# Patient Record
Sex: MALE | Race: WHITE | URBAN - METROPOLITAN AREA
[De-identification: names, ages, dates, MRNs, and addresses within clinical notes are randomized per-mention and may not be internally consistent; named-entity substitution may affect disease eponyms.]

---

## 2022-08-22 ENCOUNTER — OFFICE VISIT (OUTPATIENT)
Dept: URBAN - METROPOLITAN AREA CLINIC 76 | Facility: CLINIC | Age: 57
End: 2022-08-22
Payer: COMMERCIAL

## 2022-08-22 DIAGNOSIS — H10.45 OTHER CHRONIC ALLERGIC CONJUNCTIVITIS: ICD-10-CM

## 2022-08-22 DIAGNOSIS — H25.813 COMBINED FORMS OF AGE-RELATED CATARACT, BILATERAL: Primary | ICD-10-CM

## 2022-08-22 PROCEDURE — 99203 OFFICE O/P NEW LOW 30 MIN: CPT | Performed by: OPTOMETRIST

## 2022-08-22 ASSESSMENT — INTRAOCULAR PRESSURE
OD: 15
OS: 14

## 2022-08-22 NOTE — IMPRESSION/PLAN
Impression: Combined forms of age-related cataract, bilateral: H25.813. Bilateral.
 Plan: Cataracts account for the patient's complaints. Patient understands changing glasses will not improve vision. Recommend tremaine mahoney/ Dr. Charly Armijo for cataract surgery.

## 2022-08-22 NOTE — IMPRESSION/PLAN
Impression: Other chronic allergic conjunctivitis: H10.45. Bilateral.
No chemical burns seen OU Plan: Discussed diagnosis with patient. Recommend OTC oral antihistamine, and Ketotifen 1 drop bid ou, prn. Rub excess into lids. Recommend refrigerating drops.

## 2022-09-02 ENCOUNTER — PRE-OPERATIVE VISIT (OUTPATIENT)
Dept: URBAN - METROPOLITAN AREA CLINIC 76 | Facility: CLINIC | Age: 57
End: 2022-09-02
Payer: COMMERCIAL

## 2022-09-02 DIAGNOSIS — H25.813 COMBINED FORMS OF AGE-RELATED CATARACT, BILATERAL: Primary | ICD-10-CM

## 2022-09-02 PROCEDURE — 92134 CPTRZ OPH DX IMG PST SGM RTA: CPT | Performed by: STUDENT IN AN ORGANIZED HEALTH CARE EDUCATION/TRAINING PROGRAM

## 2022-09-02 PROCEDURE — 92025 CPTRIZED CORNEAL TOPOGRAPHY: CPT | Performed by: STUDENT IN AN ORGANIZED HEALTH CARE EDUCATION/TRAINING PROGRAM

## 2022-09-02 ASSESSMENT — PACHYMETRY
OD: 3.65
OD: 24.42
OS: 24.20
OS: 3.29

## 2022-09-13 ENCOUNTER — OFFICE VISIT (OUTPATIENT)
Dept: URBAN - METROPOLITAN AREA CLINIC 76 | Facility: CLINIC | Age: 57
End: 2022-09-13
Payer: COMMERCIAL

## 2022-09-13 DIAGNOSIS — H52.223 REGULAR ASTIGMATISM, BILATERAL: ICD-10-CM

## 2022-09-13 DIAGNOSIS — H25.813 COMBINED FORMS OF AGE-RELATED CATARACT, BILATERAL: Primary | ICD-10-CM

## 2022-09-13 PROCEDURE — 99204 OFFICE O/P NEW MOD 45 MIN: CPT | Performed by: STUDENT IN AN ORGANIZED HEALTH CARE EDUCATION/TRAINING PROGRAM

## 2022-09-13 ASSESSMENT — INTRAOCULAR PRESSURE
OS: 14
OD: 16

## 2022-09-13 ASSESSMENT — VISUAL ACUITY
OS: 20/20
OD: 20/80

## 2022-09-13 ASSESSMENT — KERATOMETRY
OS: 42.63
OD: 42.13

## 2022-09-13 NOTE — IMPRESSION/PLAN
Impression: Combined forms of age-related cataract, bilateral: H25.813. Pt denies trauma, steroid use or DM. OS does not meet requirements. Plan: Chart has been reviewed prior to seeing the patient and additional testing is necessary including A-scan/Lens Biometry. Discussed cataracts, treatment options, and surgical risks/benefits with patient including bleeding, infection, capsular break, glaucoma, corneal clouding. Patient understands there are tradeoffs to each intraocular lens choice and glasses may still be necessary after surgery. Pt understands that multifocal intraocular lenses have side effects including but not limited to Halos/Glare/Difficulty in Dim Lighting and Intermediate vision. The patient is bothered by the symptoms of his cataract which is not correctable with a change in glasses and their ADL's are impaired. Patient elects surgical treatment. Post op care to be patient's choice of provider/clinic. Recommend ORA. Recommend dropless approach (Dexycu or Dextenza)+ Moxifloxacin (PF) Intracameral Inj
Lens Recommendation: MONOFOCAL or PANOPTIX Technology: OK for [employer] Aim OD: -0.25. [employer]: OD ONLY Notes:

## 2022-10-17 ENCOUNTER — SURGERY (OUTPATIENT)
Dept: URBAN - METROPOLITAN AREA SURGERY 47 | Facility: SURGERY | Age: 57
End: 2022-10-17
Payer: COMMERCIAL

## 2022-10-17 DIAGNOSIS — H25.813 COMBINED FORMS OF AGE-RELATED CATARACT, BILATERAL: Primary | ICD-10-CM

## 2022-10-17 PROCEDURE — 66984 XCAPSL CTRC RMVL W/O ECP: CPT | Performed by: STUDENT IN AN ORGANIZED HEALTH CARE EDUCATION/TRAINING PROGRAM

## 2022-10-17 PROCEDURE — PR1FY PR1FY: CUSTOM | Performed by: STUDENT IN AN ORGANIZED HEALTH CARE EDUCATION/TRAINING PROGRAM

## 2022-10-17 RX ORDER — PREDNISOLONE ACETATE 10 MG/ML
1 % SUSPENSION/ DROPS OPHTHALMIC
Qty: 5 | Refills: 1 | Status: INACTIVE
Start: 2022-10-17 | End: 2022-10-18

## 2022-10-18 ENCOUNTER — POST-OPERATIVE VISIT (OUTPATIENT)
Dept: URBAN - METROPOLITAN AREA CLINIC 76 | Facility: CLINIC | Age: 57
End: 2022-10-18
Payer: COMMERCIAL

## 2022-10-18 DIAGNOSIS — Z48.810 ENCOUNTER FOR SURGICAL AFTERCARE FOLLOWING SURGERY ON A SENSE ORGAN: Primary | ICD-10-CM

## 2022-10-18 ASSESSMENT — INTRAOCULAR PRESSURE
OS: 14
OD: 18

## 2022-10-18 NOTE — IMPRESSION/PLAN
Impression: S/P Cataract Extraction by phacoemulsification with IOL placement; ORA OD - 1 Day. Encounter for surgical aftercare following surgery on a sense organ  Z48.810. Excellent post op course   Post operative instructions reviewed - Pt complaining of blurry vision at near. Pt wants to know when his vision will become clear. Plan: I explained that he just had surgery and day 1 is usually the worse vision day. I explained that his vision should improve as the inflammation decreases but I cannot tell him how much it will improve. Pred TID OD for 4 weeks. Ofloxacin TID OS for 1 week.

## 2022-10-24 ENCOUNTER — POST-OPERATIVE VISIT (OUTPATIENT)
Dept: URBAN - METROPOLITAN AREA CLINIC 76 | Facility: CLINIC | Age: 57
End: 2022-10-24
Payer: COMMERCIAL

## 2022-10-24 DIAGNOSIS — Z48.810 ENCOUNTER FOR SURGICAL AFTERCARE FOLLOWING SURGERY ON A SENSE ORGAN: Primary | ICD-10-CM

## 2022-10-24 ASSESSMENT — VISUAL ACUITY
OD: 20/20
OS: 20/20

## 2022-10-24 ASSESSMENT — INTRAOCULAR PRESSURE
OD: 14
OS: 14

## 2022-10-24 NOTE — IMPRESSION/PLAN
Impression: S/P Cataract Extraction by phacoemulsification with IOL placement; ORA OD - 7 Days. Encounter for surgical aftercare following surgery on a sense organ  Z48.810. Post operative instructions reviewed - OS does not meet sx requirements. Plan: Discussed. Recommend using At's for comfort. d/c Ocuflox. Continue Pred TID OD as directed. Pt to call with concerns.

## 2022-11-14 ENCOUNTER — POST-OPERATIVE VISIT (OUTPATIENT)
Dept: URBAN - METROPOLITAN AREA CLINIC 76 | Facility: CLINIC | Age: 57
End: 2022-11-14
Payer: COMMERCIAL

## 2022-11-14 DIAGNOSIS — Z48.810 ENCOUNTER FOR SURGICAL AFTERCARE FOLLOWING SURGERY ON A SENSE ORGAN: Primary | ICD-10-CM

## 2022-11-14 ASSESSMENT — INTRAOCULAR PRESSURE
OS: 18
OD: 17

## 2022-11-14 ASSESSMENT — VISUAL ACUITY
OD: 20/20-
OS: 20/20-

## 2022-11-14 NOTE — IMPRESSION/PLAN
Impression: S/P Cataract Extraction by phacoemulsification with IOL placement; ORA OD - 28 Days. Encounter for surgical aftercare following surgery on a sense organ  Z48.810. Post operative instructions reviewed - Plan: Final Mrx given today. Continue Pred BID OD x1 week , QD OD x 1 week then d/c. Pt to call with concerns. Continue artificial tears.

## 2023-01-17 ENCOUNTER — OFFICE VISIT (OUTPATIENT)
Dept: URBAN - METROPOLITAN AREA CLINIC 76 | Facility: CLINIC | Age: 58
End: 2023-01-17
Payer: COMMERCIAL

## 2023-01-17 DIAGNOSIS — H43.811 VITREOUS DEGENERATION, RIGHT EYE: Primary | ICD-10-CM

## 2023-01-17 DIAGNOSIS — Z96.1 PRESENCE OF INTRAOCULAR LENS: ICD-10-CM

## 2023-01-17 DIAGNOSIS — H25.12 AGE-RELATED NUCLEAR CATARACT, LEFT EYE: ICD-10-CM

## 2023-01-17 PROCEDURE — 99214 OFFICE O/P EST MOD 30 MIN: CPT | Performed by: OPTOMETRIST

## 2023-01-17 ASSESSMENT — INTRAOCULAR PRESSURE
OS: 17
OD: 16

## 2023-01-17 ASSESSMENT — KERATOMETRY
OS: 42.75
OD: 42.25

## 2023-01-17 NOTE — IMPRESSION/PLAN
Impression: Age-related nuclear cataract, left eye: H25.12. Left. Plan: Discussed condition. Continue to monitor without treatment at this time.

## 2023-06-06 ENCOUNTER — OFFICE VISIT (OUTPATIENT)
Dept: URBAN - METROPOLITAN AREA CLINIC 81 | Facility: CLINIC | Age: 58
End: 2023-06-06
Payer: COMMERCIAL

## 2023-06-06 DIAGNOSIS — H25.12 AGE-RELATED NUCLEAR CATARACT, LEFT EYE: ICD-10-CM

## 2023-06-06 DIAGNOSIS — D31.31 BENIGN NEOPLASM OF RIGHT CHOROID: ICD-10-CM

## 2023-06-06 DIAGNOSIS — H43.811 VITREOUS DEGENERATION, RIGHT EYE: Primary | ICD-10-CM

## 2023-06-06 DIAGNOSIS — Z96.1 PRESENCE OF INTRAOCULAR LENS: ICD-10-CM

## 2023-06-06 PROCEDURE — 99213 OFFICE O/P EST LOW 20 MIN: CPT | Performed by: OPTOMETRIST

## 2023-06-06 ASSESSMENT — INTRAOCULAR PRESSURE
OS: 13
OD: 18

## 2023-06-06 NOTE — IMPRESSION/PLAN
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
- Baseline fundus photo taken today Plan: Discussed diagnosis with patient in detail. No treatment is required at this time. Will continue to observe condition and/or symptoms. Patient instructed to call if vision changes occur.

## 2023-06-06 NOTE — IMPRESSION/PLAN
Impression: Age-related nuclear cataract, left eye: H25.12. Plan: Discussed. Continue to observe. No treatment currently recommended as cataracts do not affect the patients day to day life. Patient to monitor for vision changes and if vision significantly worsens, advised to RTC for evaluation.